# Patient Record
Sex: FEMALE | Race: WHITE
[De-identification: names, ages, dates, MRNs, and addresses within clinical notes are randomized per-mention and may not be internally consistent; named-entity substitution may affect disease eponyms.]

---

## 2018-11-21 ENCOUNTER — HOSPITAL ENCOUNTER (OUTPATIENT)
Dept: HOSPITAL 47 - RADMRIMAIN | Age: 48
Discharge: HOME | End: 2018-11-21
Attending: OTOLARYNGOLOGY
Payer: COMMERCIAL

## 2018-11-21 DIAGNOSIS — H93.3X9: ICD-10-CM

## 2018-11-21 DIAGNOSIS — R90.89: Primary | ICD-10-CM

## 2018-11-21 DIAGNOSIS — H93.19: ICD-10-CM

## 2018-11-21 DIAGNOSIS — H91.90: ICD-10-CM

## 2018-11-21 PROCEDURE — 70553 MRI BRAIN STEM W/O & W/DYE: CPT

## 2018-11-22 NOTE — MR
EXAMINATION TYPE: MR brain and iac wo/w con

 

DATE OF EXAM: 11/21/2018

 

COMPARISON: None

 

HISTORY: Headaches, Dizzy, Left Sided Hearing Loss, Gadavist 7.5ml

 

TECHNIQUE: 

Multiplanar, multisequence images of the brain and brainstem is performed without and with IV contras
t, utilizing 7.5 mL intravenous Gadavist .

 

FINDINGS: 

Ventricles and sulci appear normal. There is no mass effect nor midline shift. There is no sign of in
tracranial hemorrhage. There is a 4 mm rounded area of fluid signal in the left posterior temporal lo
be white matter. The brainstem is intact. Corpus callosum appears normal. Sella turcica appears evert
l. The cerebellum appears intact. There is no evidence of cerebral cortical infarct.

 

The internal auditory canals appear normal. There is no evidence of cerebellopontine angle mass. Ther
e is symmetric appearance of the acoustic and vestibular nerves. There is normal signal pattern of th
e temporal bones. There is no evidence of mastoiditis. The fercho appears normal.

 

Contrast images show no pathologic enhancement. Pituitary stalk is in the midline. Pituitary gland ap
pears normal. There is normal contrast opacification of the venous sinuses.

IMPRESSION: Negative MR scan of the brain. I do not see a cause for hearing loss. No posterior fossa 
abnormality seen.

 

Small rounded fluid signal focus in the left posterior temporal lobe of doubtful significance.

## 2019-09-05 ENCOUNTER — HOSPITAL ENCOUNTER (OUTPATIENT)
Dept: HOSPITAL 47 - RADMRIMAIN | Age: 49
Discharge: HOME | End: 2019-09-05
Attending: PSYCHIATRY & NEUROLOGY
Payer: COMMERCIAL

## 2019-09-05 DIAGNOSIS — G43.909: ICD-10-CM

## 2019-09-05 DIAGNOSIS — J32.2: ICD-10-CM

## 2019-09-05 DIAGNOSIS — I67.82: Primary | ICD-10-CM

## 2019-09-05 PROCEDURE — 70553 MRI BRAIN STEM W/O & W/DYE: CPT

## 2019-09-05 NOTE — MR
EXAMINATION TYPE: MR brain wo/w con

 

DATE OF EXAM: 9/5/2019

 

COMPARISON: 11/21/2018

 

HISTORY: Dizziness, tinnitus

 

TECHNIQUE: 

Multiplanar, multisequence images of the brain and brainstem is performed without and with IV contras
t, utilizing 7.5 mL intravenous Gadavist .

 

FINDINGS: Diffusion weighted images demonstrate no evidence of a recent infarct or other diffusion ab
normality. Ventricular system is midline without evidence of displacement. Small focal area of abnorm
al signal involving right frontal and  left temporal white matter is nonspecific. 

 

No midline shift. No pathologic enhancement. Midline structures demonstrate normal morphology.  The c
raniocervical junction appears within normal limits.  Post contrast images demonstrate no abnormal en
hancement. The dural venous sinuses appear patent.

 

There are changes of chronic sinusitis. No evidence of cerebellopontine angle mass

 

IMPRESSION: 

1. Severe changes of ethmoidal sinusitis.

2. There are 2 small less than 5 mm areas of abnormal signal the white matter which are nonspecific a
nd too small to characterize. Migraine headaches, hypertension, remote microvascular ischemia are in 
the differential diagnosis. Demyelinating process not entirely excluded.